# Patient Record
Sex: FEMALE | Race: OTHER | NOT HISPANIC OR LATINO | URBAN - METROPOLITAN AREA
[De-identification: names, ages, dates, MRNs, and addresses within clinical notes are randomized per-mention and may not be internally consistent; named-entity substitution may affect disease eponyms.]

---

## 2019-04-11 ENCOUNTER — OUTPATIENT (OUTPATIENT)
Dept: OUTPATIENT SERVICES | Age: 39
LOS: 1 days | Discharge: HOME | End: 2019-04-11

## 2019-04-12 ENCOUNTER — APPOINTMENT (OUTPATIENT)
Dept: PEDIATRIC CARDIOLOGY | Facility: CLINIC | Age: 39
End: 2019-04-12
Payer: MEDICAID

## 2019-04-12 PROCEDURE — 93325 DOPPLER ECHO COLOR FLOW MAPG: CPT | Mod: 59

## 2019-04-12 PROCEDURE — 76825 ECHO EXAM OF FETAL HEART: CPT

## 2019-04-12 PROCEDURE — 76827 ECHO EXAM OF FETAL HEART: CPT

## 2019-04-12 PROCEDURE — 99202 OFFICE O/P NEW SF 15 MIN: CPT | Mod: 25

## 2019-04-12 PROCEDURE — 76820 UMBILICAL ARTERY ECHO: CPT

## 2019-06-16 ENCOUNTER — OUTPATIENT (OUTPATIENT)
Dept: OUTPATIENT SERVICES | Facility: HOSPITAL | Age: 39
LOS: 1 days | End: 2019-06-16
Payer: MEDICAID

## 2019-06-16 DIAGNOSIS — O26.899 OTHER SPECIFIED PREGNANCY RELATED CONDITIONS, UNSPECIFIED TRIMESTER: ICD-10-CM

## 2019-06-16 DIAGNOSIS — Z3A.00 WEEKS OF GESTATION OF PREGNANCY NOT SPECIFIED: ICD-10-CM

## 2019-06-16 PROCEDURE — G0463: CPT

## 2019-06-16 PROCEDURE — 59025 FETAL NON-STRESS TEST: CPT

## 2019-06-16 PROCEDURE — 59025 FETAL NON-STRESS TEST: CPT | Mod: 26

## 2019-07-15 ENCOUNTER — APPOINTMENT (OUTPATIENT)
Dept: PEDIATRIC NEPHROLOGY | Facility: CLINIC | Age: 39
End: 2019-07-15
Payer: MEDICAID

## 2019-07-15 VITALS
HEIGHT: 65.35 IN | WEIGHT: 182.43 LBS | HEART RATE: 91 BPM | BODY MASS INDEX: 30.03 KG/M2 | SYSTOLIC BLOOD PRESSURE: 103 MMHG | DIASTOLIC BLOOD PRESSURE: 65 MMHG

## 2019-07-15 DIAGNOSIS — Z84.1 FAMILY HISTORY OF DISORDERS OF KIDNEY AND URETER: ICD-10-CM

## 2019-07-15 DIAGNOSIS — O35.8XX0 MATERNAL CARE FOR OTHER (SUSPECTED) FETAL ABNORMALITY AND DAMAGE, NOT APPLICABLE OR UNSPECIFIED: ICD-10-CM

## 2019-07-15 PROCEDURE — 99203 OFFICE O/P NEW LOW 30 MIN: CPT

## 2019-07-24 ENCOUNTER — OUTPATIENT (OUTPATIENT)
Dept: OUTPATIENT SERVICES | Facility: HOSPITAL | Age: 39
LOS: 1 days | End: 2019-07-24
Payer: MEDICAID

## 2019-07-24 DIAGNOSIS — Z3A.00 WEEKS OF GESTATION OF PREGNANCY NOT SPECIFIED: ICD-10-CM

## 2019-07-24 DIAGNOSIS — O26.899 OTHER SPECIFIED PREGNANCY RELATED CONDITIONS, UNSPECIFIED TRIMESTER: ICD-10-CM

## 2019-07-24 LAB — AMNISURE ROM (RUPTURE OF MEMBRANES): NEGATIVE — SIGNIFICANT CHANGE UP

## 2019-07-24 PROCEDURE — 76815 OB US LIMITED FETUS(S): CPT

## 2019-07-24 PROCEDURE — 59025 FETAL NON-STRESS TEST: CPT | Mod: 26

## 2019-07-24 PROCEDURE — G0463: CPT

## 2019-07-24 PROCEDURE — 84112 EVAL AMNIOTIC FLUID PROTEIN: CPT

## 2019-07-24 PROCEDURE — 76819 FETAL BIOPHYS PROFIL W/O NST: CPT

## 2019-07-24 PROCEDURE — 96360 HYDRATION IV INFUSION INIT: CPT | Mod: 59

## 2019-07-24 PROCEDURE — 76815 OB US LIMITED FETUS(S): CPT | Mod: 26

## 2019-07-24 PROCEDURE — 76819 FETAL BIOPHYS PROFIL W/O NST: CPT | Mod: 26

## 2019-07-24 PROCEDURE — 59025 FETAL NON-STRESS TEST: CPT

## 2019-07-24 RX ORDER — SODIUM CHLORIDE 9 MG/ML
1000 INJECTION, SOLUTION INTRAVENOUS ONCE
Refills: 0 | Status: DISCONTINUED | OUTPATIENT
Start: 2019-07-24 | End: 2019-08-09

## 2019-07-26 ENCOUNTER — OUTPATIENT (OUTPATIENT)
Dept: OUTPATIENT SERVICES | Facility: HOSPITAL | Age: 39
LOS: 1 days | End: 2019-07-26
Payer: MEDICAID

## 2019-07-26 DIAGNOSIS — Z3A.00 WEEKS OF GESTATION OF PREGNANCY NOT SPECIFIED: ICD-10-CM

## 2019-07-26 DIAGNOSIS — O26.899 OTHER SPECIFIED PREGNANCY RELATED CONDITIONS, UNSPECIFIED TRIMESTER: ICD-10-CM

## 2019-07-26 PROCEDURE — 59025 FETAL NON-STRESS TEST: CPT

## 2019-07-26 PROCEDURE — 76819 FETAL BIOPHYS PROFIL W/O NST: CPT

## 2019-07-26 PROCEDURE — 59025 FETAL NON-STRESS TEST: CPT | Mod: 26

## 2019-07-26 PROCEDURE — 96360 HYDRATION IV INFUSION INIT: CPT

## 2019-07-26 PROCEDURE — 76819 FETAL BIOPHYS PROFIL W/O NST: CPT | Mod: 26

## 2019-07-26 PROCEDURE — G0463: CPT

## 2019-07-26 RX ORDER — SODIUM CHLORIDE 9 MG/ML
1000 INJECTION, SOLUTION INTRAVENOUS ONCE
Refills: 0 | Status: COMPLETED | OUTPATIENT
Start: 2019-07-26 | End: 2019-07-26

## 2019-07-26 RX ADMIN — SODIUM CHLORIDE 1000 MILLILITER(S): 9 INJECTION, SOLUTION INTRAVENOUS at 16:20

## 2019-07-26 RX ADMIN — SODIUM CHLORIDE 1000 MILLILITER(S): 9 INJECTION, SOLUTION INTRAVENOUS at 14:59

## 2019-07-31 ENCOUNTER — TRANSCRIPTION ENCOUNTER (OUTPATIENT)
Age: 39
End: 2019-07-31

## 2019-07-31 ENCOUNTER — OUTPATIENT (OUTPATIENT)
Dept: OUTPATIENT SERVICES | Facility: HOSPITAL | Age: 39
LOS: 1 days | End: 2019-07-31
Payer: MEDICAID

## 2019-07-31 DIAGNOSIS — Z3A.39 39 WEEKS GESTATION OF PREGNANCY: ICD-10-CM

## 2019-07-31 DIAGNOSIS — Z01.818 ENCOUNTER FOR OTHER PREPROCEDURAL EXAMINATION: ICD-10-CM

## 2019-07-31 LAB
ANION GAP SERPL CALC-SCNC: 6 MMOL/L — SIGNIFICANT CHANGE UP (ref 5–17)
APTT BLD: 33.6 SEC — SIGNIFICANT CHANGE UP (ref 27.5–36.3)
BLD GP AB SCN SERPL QL: SIGNIFICANT CHANGE UP
BUN SERPL-MCNC: 10 MG/DL — SIGNIFICANT CHANGE UP (ref 7–18)
CALCIUM SERPL-MCNC: 9.3 MG/DL — SIGNIFICANT CHANGE UP (ref 8.4–10.5)
CHLORIDE SERPL-SCNC: 105 MMOL/L — SIGNIFICANT CHANGE UP (ref 96–108)
CO2 SERPL-SCNC: 25 MMOL/L — SIGNIFICANT CHANGE UP (ref 22–31)
CREAT SERPL-MCNC: 0.55 MG/DL — SIGNIFICANT CHANGE UP (ref 0.5–1.3)
GLUCOSE SERPL-MCNC: 128 MG/DL — HIGH (ref 70–99)
HCT VFR BLD CALC: 38 % — SIGNIFICANT CHANGE UP (ref 34.5–45)
HGB BLD-MCNC: 13 G/DL — SIGNIFICANT CHANGE UP (ref 11.5–15.5)
INR BLD: 0.9 RATIO — SIGNIFICANT CHANGE UP (ref 0.88–1.16)
MCHC RBC-ENTMCNC: 29.7 PG — SIGNIFICANT CHANGE UP (ref 27–34)
MCHC RBC-ENTMCNC: 34.2 GM/DL — SIGNIFICANT CHANGE UP (ref 32–36)
MCV RBC AUTO: 86.8 FL — SIGNIFICANT CHANGE UP (ref 80–100)
NRBC # BLD: 0 /100 WBCS — SIGNIFICANT CHANGE UP (ref 0–0)
PLATELET # BLD AUTO: 262 K/UL — SIGNIFICANT CHANGE UP (ref 150–400)
POTASSIUM SERPL-MCNC: 3.4 MMOL/L — LOW (ref 3.5–5.3)
POTASSIUM SERPL-SCNC: 3.4 MMOL/L — LOW (ref 3.5–5.3)
PROTHROM AB SERPL-ACNC: 10 SEC — SIGNIFICANT CHANGE UP (ref 10–12.9)
RBC # BLD: 4.38 M/UL — SIGNIFICANT CHANGE UP (ref 3.8–5.2)
RBC # FLD: 14.6 % — HIGH (ref 10.3–14.5)
SODIUM SERPL-SCNC: 136 MMOL/L — SIGNIFICANT CHANGE UP (ref 135–145)
WBC # BLD: 6.99 K/UL — SIGNIFICANT CHANGE UP (ref 3.8–10.5)
WBC # FLD AUTO: 6.99 K/UL — SIGNIFICANT CHANGE UP (ref 3.8–10.5)

## 2019-07-31 PROCEDURE — 86780 TREPONEMA PALLIDUM: CPT

## 2019-07-31 PROCEDURE — 86900 BLOOD TYPING SEROLOGIC ABO: CPT

## 2019-07-31 PROCEDURE — 36415 COLL VENOUS BLD VENIPUNCTURE: CPT

## 2019-07-31 PROCEDURE — 85730 THROMBOPLASTIN TIME PARTIAL: CPT

## 2019-07-31 PROCEDURE — 85027 COMPLETE CBC AUTOMATED: CPT

## 2019-07-31 PROCEDURE — 86901 BLOOD TYPING SEROLOGIC RH(D): CPT

## 2019-07-31 PROCEDURE — 80048 BASIC METABOLIC PNL TOTAL CA: CPT

## 2019-07-31 PROCEDURE — 86850 RBC ANTIBODY SCREEN: CPT

## 2019-07-31 PROCEDURE — 85610 PROTHROMBIN TIME: CPT

## 2019-07-31 PROCEDURE — G0463: CPT

## 2019-08-01 LAB — T PALLIDUM AB TITR SER: NEGATIVE — SIGNIFICANT CHANGE UP

## 2019-08-04 ENCOUNTER — TRANSCRIPTION ENCOUNTER (OUTPATIENT)
Age: 39
End: 2019-08-04

## 2019-08-05 ENCOUNTER — RESULT REVIEW (OUTPATIENT)
Age: 39
End: 2019-08-05

## 2019-08-05 ENCOUNTER — INPATIENT (INPATIENT)
Facility: HOSPITAL | Age: 39
LOS: 2 days | Discharge: HOME | End: 2019-08-08
Attending: OBSTETRICS & GYNECOLOGY | Admitting: OBSTETRICS & GYNECOLOGY
Payer: COMMERCIAL

## 2019-08-05 VITALS — HEIGHT: 64 IN | WEIGHT: 182.98 LBS

## 2019-08-05 DIAGNOSIS — Z3A.39 39 WEEKS GESTATION OF PREGNANCY: ICD-10-CM

## 2019-08-05 LAB
HIV 1 & 2 AB SERPL IA.RAPID: SIGNIFICANT CHANGE UP
HIV 1+2 AB+HIV1 P24 AG SERPL QL IA: SIGNIFICANT CHANGE UP

## 2019-08-05 PROCEDURE — 88307 TISSUE EXAM BY PATHOLOGIST: CPT | Mod: 26

## 2019-08-05 RX ORDER — SODIUM CHLORIDE 9 MG/ML
1000 INJECTION, SOLUTION INTRAVENOUS ONCE
Refills: 0 | Status: COMPLETED | OUTPATIENT
Start: 2019-08-05 | End: 2019-08-05

## 2019-08-05 RX ORDER — SODIUM CHLORIDE 9 MG/ML
1000 INJECTION, SOLUTION INTRAVENOUS
Refills: 0 | Status: DISCONTINUED | OUTPATIENT
Start: 2019-08-05 | End: 2019-08-08

## 2019-08-05 RX ORDER — LANOLIN
1 OINTMENT (GRAM) TOPICAL EVERY 6 HOURS
Refills: 0 | Status: DISCONTINUED | OUTPATIENT
Start: 2019-08-05 | End: 2019-08-08

## 2019-08-05 RX ORDER — IBUPROFEN 200 MG
600 TABLET ORAL EVERY 6 HOURS
Refills: 0 | Status: COMPLETED | OUTPATIENT
Start: 2019-08-05 | End: 2020-07-03

## 2019-08-05 RX ORDER — ACETAMINOPHEN 500 MG
975 TABLET ORAL
Refills: 0 | Status: DISCONTINUED | OUTPATIENT
Start: 2019-08-05 | End: 2019-08-08

## 2019-08-05 RX ORDER — DOCUSATE SODIUM 100 MG
100 CAPSULE ORAL
Refills: 0 | Status: DISCONTINUED | OUTPATIENT
Start: 2019-08-05 | End: 2019-08-06

## 2019-08-05 RX ORDER — HEPARIN SODIUM 5000 [USP'U]/ML
5000 INJECTION INTRAVENOUS; SUBCUTANEOUS EVERY 12 HOURS
Refills: 0 | Status: DISCONTINUED | OUTPATIENT
Start: 2019-08-05 | End: 2019-08-08

## 2019-08-05 RX ORDER — SODIUM CHLORIDE 9 MG/ML
1000 INJECTION, SOLUTION INTRAVENOUS
Refills: 0 | Status: DISCONTINUED | OUTPATIENT
Start: 2019-08-05 | End: 2019-08-05

## 2019-08-05 RX ORDER — FOLIC ACID 0.8 MG
1 TABLET ORAL DAILY
Refills: 0 | Status: DISCONTINUED | OUTPATIENT
Start: 2019-08-06 | End: 2019-08-08

## 2019-08-05 RX ORDER — METOCLOPRAMIDE HCL 10 MG
10 TABLET ORAL ONCE
Refills: 0 | Status: DISCONTINUED | OUTPATIENT
Start: 2019-08-05 | End: 2019-08-05

## 2019-08-05 RX ORDER — FERROUS SULFATE 325(65) MG
325 TABLET ORAL DAILY
Refills: 0 | Status: DISCONTINUED | OUTPATIENT
Start: 2019-08-06 | End: 2019-08-08

## 2019-08-05 RX ORDER — GLYCERIN ADULT
1 SUPPOSITORY, RECTAL RECTAL AT BEDTIME
Refills: 0 | Status: DISCONTINUED | OUTPATIENT
Start: 2019-08-05 | End: 2019-08-08

## 2019-08-05 RX ORDER — MAGNESIUM HYDROXIDE 400 MG/1
30 TABLET, CHEWABLE ORAL DAILY
Refills: 0 | Status: DISCONTINUED | OUTPATIENT
Start: 2019-08-05 | End: 2019-08-08

## 2019-08-05 RX ORDER — OXYTOCIN 10 UNIT/ML
333.33 VIAL (ML) INJECTION
Qty: 20 | Refills: 0 | Status: DISCONTINUED | OUTPATIENT
Start: 2019-08-05 | End: 2019-08-08

## 2019-08-05 RX ORDER — SIMETHICONE 80 MG/1
80 TABLET, CHEWABLE ORAL EVERY 4 HOURS
Refills: 0 | Status: DISCONTINUED | OUTPATIENT
Start: 2019-08-05 | End: 2019-08-08

## 2019-08-05 RX ORDER — DIPHENHYDRAMINE HCL 50 MG
25 CAPSULE ORAL EVERY 6 HOURS
Refills: 0 | Status: DISCONTINUED | OUTPATIENT
Start: 2019-08-05 | End: 2019-08-08

## 2019-08-05 RX ORDER — FAMOTIDINE 10 MG/ML
20 INJECTION INTRAVENOUS ONCE
Refills: 0 | Status: COMPLETED | OUTPATIENT
Start: 2019-08-05 | End: 2019-08-05

## 2019-08-05 RX ORDER — CITRIC ACID/SODIUM CITRATE 300-500 MG
30 SOLUTION, ORAL ORAL ONCE
Refills: 0 | Status: COMPLETED | OUTPATIENT
Start: 2019-08-05 | End: 2019-08-05

## 2019-08-05 RX ORDER — TETANUS TOXOID, REDUCED DIPHTHERIA TOXOID AND ACELLULAR PERTUSSIS VACCINE, ADSORBED 5; 2.5; 8; 8; 2.5 [IU]/.5ML; [IU]/.5ML; UG/.5ML; UG/.5ML; UG/.5ML
0.5 SUSPENSION INTRAMUSCULAR ONCE
Refills: 0 | Status: DISCONTINUED | OUTPATIENT
Start: 2019-08-05 | End: 2019-08-08

## 2019-08-05 RX ORDER — CEFAZOLIN SODIUM 1 G
2000 VIAL (EA) INJECTION ONCE
Refills: 0 | Status: COMPLETED | OUTPATIENT
Start: 2019-08-05 | End: 2019-08-05

## 2019-08-05 RX ORDER — KETOROLAC TROMETHAMINE 30 MG/ML
30 SYRINGE (ML) INJECTION EVERY 6 HOURS
Refills: 0 | Status: DISCONTINUED | OUTPATIENT
Start: 2019-08-05 | End: 2019-08-06

## 2019-08-05 RX ORDER — ASCORBIC ACID 60 MG
500 TABLET,CHEWABLE ORAL DAILY
Refills: 0 | Status: DISCONTINUED | OUTPATIENT
Start: 2019-08-05 | End: 2019-08-08

## 2019-08-05 RX ADMIN — Medication 100 MILLIGRAM(S): at 07:55

## 2019-08-05 RX ADMIN — Medication 30 MILLIGRAM(S): at 14:50

## 2019-08-05 RX ADMIN — Medication 30 MILLIGRAM(S): at 20:13

## 2019-08-05 RX ADMIN — Medication 30 MILLILITER(S): at 07:25

## 2019-08-05 RX ADMIN — Medication 975 MILLIGRAM(S): at 22:33

## 2019-08-05 RX ADMIN — FAMOTIDINE 20 MILLIGRAM(S): 10 INJECTION INTRAVENOUS at 07:25

## 2019-08-05 RX ADMIN — SODIUM CHLORIDE 125 MILLILITER(S): 9 INJECTION, SOLUTION INTRAVENOUS at 20:13

## 2019-08-05 RX ADMIN — Medication 30 MILLIGRAM(S): at 21:13

## 2019-08-05 RX ADMIN — SODIUM CHLORIDE 125 MILLILITER(S): 9 INJECTION, SOLUTION INTRAVENOUS at 07:22

## 2019-08-05 RX ADMIN — Medication 30 MILLIGRAM(S): at 14:20

## 2019-08-05 RX ADMIN — SODIUM CHLORIDE 2000 MILLILITER(S): 9 INJECTION, SOLUTION INTRAVENOUS at 06:30

## 2019-08-05 RX ADMIN — HEPARIN SODIUM 5000 UNIT(S): 5000 INJECTION INTRAVENOUS; SUBCUTANEOUS at 22:32

## 2019-08-05 RX ADMIN — Medication 1000 MILLIUNIT(S)/MIN: at 10:08

## 2019-08-05 RX ADMIN — Medication 975 MILLIGRAM(S): at 23:30

## 2019-08-05 NOTE — CHART NOTE - NSCHARTNOTEFT_GEN_A_CORE
pt doing well   offers no acute complaints    Vital Signs Last 24 Hrs  T(C): 36.7 (05 Aug 2019 13:17), Max: 36.7 (05 Aug 2019 13:17)  T(F): 98 (05 Aug 2019 13:17), Max: 98 (05 Aug 2019 13:17)  HR: 99 (05 Aug 2019 13:17) (68 - 103)  BP: 98/63 (05 Aug 2019 13:17) (88/54 - 121/93)  BP(mean): --  RR: 18 (05 Aug 2019 13:17) (15 - 20)  SpO2: 98% (05 Aug 2019 13:17) (95% - 100%)    dressing c/d  lochia min-mod  ext +venodyne boots  -weldon clear urine    a/p  pod#0 s/p rpt cs  -cont post op care

## 2019-08-06 LAB
ANION GAP SERPL CALC-SCNC: 5 MMOL/L — SIGNIFICANT CHANGE UP (ref 5–17)
BASOPHILS # BLD AUTO: 0.02 K/UL — SIGNIFICANT CHANGE UP (ref 0–0.2)
BASOPHILS NFR BLD AUTO: 0.2 % — SIGNIFICANT CHANGE UP (ref 0–2)
BUN SERPL-MCNC: 6 MG/DL — LOW (ref 7–18)
CALCIUM SERPL-MCNC: 8.4 MG/DL — SIGNIFICANT CHANGE UP (ref 8.4–10.5)
CHLORIDE SERPL-SCNC: 104 MMOL/L — SIGNIFICANT CHANGE UP (ref 96–108)
CO2 SERPL-SCNC: 30 MMOL/L — SIGNIFICANT CHANGE UP (ref 22–31)
CREAT SERPL-MCNC: 0.6 MG/DL — SIGNIFICANT CHANGE UP (ref 0.5–1.3)
EOSINOPHIL # BLD AUTO: 0.05 K/UL — SIGNIFICANT CHANGE UP (ref 0–0.5)
EOSINOPHIL NFR BLD AUTO: 0.4 % — SIGNIFICANT CHANGE UP (ref 0–6)
GLUCOSE SERPL-MCNC: 81 MG/DL — SIGNIFICANT CHANGE UP (ref 70–99)
HCT VFR BLD CALC: 30.2 % — LOW (ref 34.5–45)
HGB BLD-MCNC: 10.1 G/DL — LOW (ref 11.5–15.5)
IMM GRANULOCYTES NFR BLD AUTO: 0.5 % — SIGNIFICANT CHANGE UP (ref 0–1.5)
LYMPHOCYTES # BLD AUTO: 1.33 K/UL — SIGNIFICANT CHANGE UP (ref 1–3.3)
LYMPHOCYTES # BLD AUTO: 10.1 % — LOW (ref 13–44)
MCHC RBC-ENTMCNC: 29.5 PG — SIGNIFICANT CHANGE UP (ref 27–34)
MCHC RBC-ENTMCNC: 33.4 GM/DL — SIGNIFICANT CHANGE UP (ref 32–36)
MCV RBC AUTO: 88.3 FL — SIGNIFICANT CHANGE UP (ref 80–100)
MONOCYTES # BLD AUTO: 0.76 K/UL — SIGNIFICANT CHANGE UP (ref 0–0.9)
MONOCYTES NFR BLD AUTO: 5.8 % — SIGNIFICANT CHANGE UP (ref 2–14)
NEUTROPHILS # BLD AUTO: 10.99 K/UL — HIGH (ref 1.8–7.4)
NEUTROPHILS NFR BLD AUTO: 83 % — HIGH (ref 43–77)
NRBC # BLD: 0 /100 WBCS — SIGNIFICANT CHANGE UP (ref 0–0)
PLATELET # BLD AUTO: 222 K/UL — SIGNIFICANT CHANGE UP (ref 150–400)
POTASSIUM SERPL-MCNC: 3.8 MMOL/L — SIGNIFICANT CHANGE UP (ref 3.5–5.3)
POTASSIUM SERPL-SCNC: 3.8 MMOL/L — SIGNIFICANT CHANGE UP (ref 3.5–5.3)
RBC # BLD: 3.42 M/UL — LOW (ref 3.8–5.2)
RBC # FLD: 14.8 % — HIGH (ref 10.3–14.5)
SODIUM SERPL-SCNC: 139 MMOL/L — SIGNIFICANT CHANGE UP (ref 135–145)
WBC # BLD: 13.21 K/UL — HIGH (ref 3.8–10.5)
WBC # FLD AUTO: 13.21 K/UL — HIGH (ref 3.8–10.5)

## 2019-08-06 RX ORDER — DOCUSATE SODIUM 100 MG
100 CAPSULE ORAL
Refills: 0 | Status: DISCONTINUED | OUTPATIENT
Start: 2019-08-06 | End: 2019-08-08

## 2019-08-06 RX ORDER — IBUPROFEN 200 MG
600 TABLET ORAL EVERY 6 HOURS
Refills: 0 | Status: DISCONTINUED | OUTPATIENT
Start: 2019-08-06 | End: 2019-08-08

## 2019-08-06 RX ADMIN — Medication 30 MILLIGRAM(S): at 10:15

## 2019-08-06 RX ADMIN — Medication 30 MILLIGRAM(S): at 09:57

## 2019-08-06 RX ADMIN — Medication 975 MILLIGRAM(S): at 14:12

## 2019-08-06 RX ADMIN — Medication 600 MILLIGRAM(S): at 22:00

## 2019-08-06 RX ADMIN — Medication 600 MILLIGRAM(S): at 21:30

## 2019-08-06 RX ADMIN — Medication 30 MILLIGRAM(S): at 02:38

## 2019-08-06 RX ADMIN — Medication 30 MILLIGRAM(S): at 03:38

## 2019-08-06 RX ADMIN — HEPARIN SODIUM 5000 UNIT(S): 5000 INJECTION INTRAVENOUS; SUBCUTANEOUS at 10:08

## 2019-08-06 RX ADMIN — Medication 1 MILLIGRAM(S): at 11:24

## 2019-08-06 RX ADMIN — Medication 1 TABLET(S): at 11:23

## 2019-08-06 RX ADMIN — Medication 975 MILLIGRAM(S): at 21:30

## 2019-08-06 RX ADMIN — Medication 325 MILLIGRAM(S): at 11:23

## 2019-08-06 RX ADMIN — HEPARIN SODIUM 5000 UNIT(S): 5000 INJECTION INTRAVENOUS; SUBCUTANEOUS at 22:00

## 2019-08-06 RX ADMIN — Medication 975 MILLIGRAM(S): at 21:00

## 2019-08-06 RX ADMIN — SIMETHICONE 80 MILLIGRAM(S): 80 TABLET, CHEWABLE ORAL at 11:24

## 2019-08-06 RX ADMIN — Medication 500 MILLIGRAM(S): at 11:23

## 2019-08-06 RX ADMIN — Medication 975 MILLIGRAM(S): at 14:45

## 2019-08-06 NOTE — PROGRESS NOTE ADULT - SUBJECTIVE AND OBJECTIVE BOX
Patient seen at [facility] resting comfortably offers no new complaints. + Ambulation to chair, weldon removed, due to void, + flatus; tolerating regular diet.  bottle feeding. Denies HA, CP, SOB, N/V/D,  no bm; dizziness, palpitations, worsening abdominal pain, worsening vaginal bleeding, or any other concerns.   Vital Signs Last 24 Hrs  T(C): 36.9 (06 Aug 2019 06:04), Max: 37.6 (05 Aug 2019 20:28)  T(F): 98.4 (06 Aug 2019 06:04), Max: 99.6 (05 Aug 2019 20:28)  HR: 92 (06 Aug 2019 06:04) (68 - 103)  BP: 92/59 (06 Aug 2019 06:04) (88/54 - 121/93)  BP(mean): --  RR: 16 (06 Aug 2019 06:04) (15 - 20)  SpO2: 98% (06 Aug 2019 06:04) (95% - 100%)    Gen: A&O x 3, NAD  Chest: CTABL  Cardiac: S1+S2+ RRR  Breast: Soft, nontender, nonengorged  Abdomen: +BS; soft; Nontender, nondistended, dressing C/D/I   Gyn: Minimal lochia  Extremities: Nontender, DTRS 2+, no worsening edema                        10.1   13.21 )-----------( 222      ( 06 Aug 2019 06:52 )             30.2       A/P: 39 year old POD #1 s/p c/s     -Pain management as needed  -cont post op care  -adv diet to regular   -OOB and ambulate  -f/u Rpt CBC in am   -encourage incentive spirometer use  -Encourage breastfeeding   -d/w dr. Joseph

## 2019-08-06 NOTE — PROGRESS NOTE ADULT - PROBLEM SELECTOR PLAN 1
-Pain management as needed  -cont post op care  -adv diet to regular   -OOB and ambulate  -f/u Rpt CBC in am   -encourage incentive spirometer use  -Encourage breastfeeding   -d/w dr. Joseph

## 2019-08-07 ENCOUNTER — TRANSCRIPTION ENCOUNTER (OUTPATIENT)
Age: 39
End: 2019-08-07

## 2019-08-07 LAB
BASOPHILS # BLD AUTO: 0.02 K/UL — SIGNIFICANT CHANGE UP (ref 0–0.2)
BASOPHILS NFR BLD AUTO: 0.1 % — SIGNIFICANT CHANGE UP (ref 0–2)
EOSINOPHIL # BLD AUTO: 0.13 K/UL — SIGNIFICANT CHANGE UP (ref 0–0.5)
EOSINOPHIL NFR BLD AUTO: 1 % — SIGNIFICANT CHANGE UP (ref 0–6)
HCT VFR BLD CALC: 32.9 % — LOW (ref 34.5–45)
HGB BLD-MCNC: 11.1 G/DL — LOW (ref 11.5–15.5)
IMM GRANULOCYTES NFR BLD AUTO: 0.8 % — SIGNIFICANT CHANGE UP (ref 0–1.5)
LYMPHOCYTES # BLD AUTO: 1.96 K/UL — SIGNIFICANT CHANGE UP (ref 1–3.3)
LYMPHOCYTES # BLD AUTO: 14.5 % — SIGNIFICANT CHANGE UP (ref 13–44)
MCHC RBC-ENTMCNC: 29.8 PG — SIGNIFICANT CHANGE UP (ref 27–34)
MCHC RBC-ENTMCNC: 33.7 GM/DL — SIGNIFICANT CHANGE UP (ref 32–36)
MCV RBC AUTO: 88.2 FL — SIGNIFICANT CHANGE UP (ref 80–100)
MONOCYTES # BLD AUTO: 0.85 K/UL — SIGNIFICANT CHANGE UP (ref 0–0.9)
MONOCYTES NFR BLD AUTO: 6.3 % — SIGNIFICANT CHANGE UP (ref 2–14)
NEUTROPHILS # BLD AUTO: 10.47 K/UL — HIGH (ref 1.8–7.4)
NEUTROPHILS NFR BLD AUTO: 77.3 % — HIGH (ref 43–77)
NRBC # BLD: 0 /100 WBCS — SIGNIFICANT CHANGE UP (ref 0–0)
PLATELET # BLD AUTO: 275 K/UL — SIGNIFICANT CHANGE UP (ref 150–400)
RBC # BLD: 3.73 M/UL — LOW (ref 3.8–5.2)
RBC # FLD: 15 % — HIGH (ref 10.3–14.5)
WBC # BLD: 13.54 K/UL — HIGH (ref 3.8–10.5)
WBC # FLD AUTO: 13.54 K/UL — HIGH (ref 3.8–10.5)

## 2019-08-07 RX ORDER — IBUPROFEN 200 MG
1 TABLET ORAL
Qty: 40 | Refills: 0
Start: 2019-08-07 | End: 2019-08-16

## 2019-08-07 RX ORDER — DOCUSATE SODIUM 100 MG
1 CAPSULE ORAL
Qty: 60 | Refills: 0
Start: 2019-08-07 | End: 2019-09-05

## 2019-08-07 RX ADMIN — Medication 100 MILLIGRAM(S): at 05:28

## 2019-08-07 RX ADMIN — Medication 325 MILLIGRAM(S): at 11:44

## 2019-08-07 RX ADMIN — HEPARIN SODIUM 5000 UNIT(S): 5000 INJECTION INTRAVENOUS; SUBCUTANEOUS at 21:30

## 2019-08-07 RX ADMIN — Medication 975 MILLIGRAM(S): at 03:20

## 2019-08-07 RX ADMIN — Medication 600 MILLIGRAM(S): at 21:29

## 2019-08-07 RX ADMIN — Medication 600 MILLIGRAM(S): at 16:30

## 2019-08-07 RX ADMIN — Medication 600 MILLIGRAM(S): at 11:20

## 2019-08-07 RX ADMIN — Medication 600 MILLIGRAM(S): at 16:00

## 2019-08-07 RX ADMIN — Medication 1 MILLIGRAM(S): at 11:45

## 2019-08-07 RX ADMIN — Medication 600 MILLIGRAM(S): at 22:00

## 2019-08-07 RX ADMIN — MAGNESIUM HYDROXIDE 30 MILLILITER(S): 400 TABLET, CHEWABLE ORAL at 11:43

## 2019-08-07 RX ADMIN — Medication 600 MILLIGRAM(S): at 03:50

## 2019-08-07 RX ADMIN — Medication 975 MILLIGRAM(S): at 15:17

## 2019-08-07 RX ADMIN — Medication 500 MILLIGRAM(S): at 11:44

## 2019-08-07 RX ADMIN — Medication 975 MILLIGRAM(S): at 03:50

## 2019-08-07 RX ADMIN — Medication 600 MILLIGRAM(S): at 03:22

## 2019-08-07 RX ADMIN — Medication 975 MILLIGRAM(S): at 22:00

## 2019-08-07 RX ADMIN — Medication 100 MILLIGRAM(S): at 17:52

## 2019-08-07 RX ADMIN — Medication 975 MILLIGRAM(S): at 16:00

## 2019-08-07 RX ADMIN — Medication 1 TABLET(S): at 11:42

## 2019-08-07 RX ADMIN — Medication 975 MILLIGRAM(S): at 21:29

## 2019-08-07 RX ADMIN — HEPARIN SODIUM 5000 UNIT(S): 5000 INJECTION INTRAVENOUS; SUBCUTANEOUS at 10:52

## 2019-08-07 RX ADMIN — Medication 975 MILLIGRAM(S): at 08:58

## 2019-08-07 RX ADMIN — Medication 975 MILLIGRAM(S): at 09:30

## 2019-08-07 RX ADMIN — Medication 600 MILLIGRAM(S): at 10:50

## 2019-08-07 NOTE — DISCHARGE NOTE OB - HOSPITAL COURSE
patient admitted for scheduled repeat c/s  operative and postoperative course uncomplicated  patient discharged home POD#3 without complication

## 2019-08-07 NOTE — DISCHARGE NOTE OB - MEDICATION SUMMARY - MEDICATIONS TO TAKE
I will START or STAY ON the medications listed below when I get home from the hospital:    ibuprofen 600 mg oral tablet  -- 1 tab(s) by mouth every 6 hours, As Needed   -- Indication: For pain    Colace 100 mg oral capsule  -- 1 cap(s) by mouth 2 times a day, As Needed   -- Medication should be taken with plenty of water.    -- Indication: For stool softener

## 2019-08-07 NOTE — PROGRESS NOTE ADULT - PROBLEM SELECTOR PLAN 1
-Pain management as needed  -cont post op care  -OOB and ambulate  -encourage insentive spirometer use  -Encourage breastfeeding  -Plan to discharge home tomorrow    -d/w Dr. Nye, [place] attending

## 2019-08-07 NOTE — DISCHARGE NOTE OB - CARE PROVIDER_API CALL
Veronica Costa)  Obstetrics and Gynecology  [address], [address]  [place] [address]  Phone: [phone]  Fax: [phone]  Follow Up Time:

## 2019-08-07 NOTE — DISCHARGE NOTE OB - PATIENT PORTAL LINK FT
You can access the [facility] Patient Portal, offered by [facility], by registering with the following website: [email or web address]

## 2019-08-07 NOTE — DISCHARGE NOTE OB - CARE PLAN
Principal Discharge DX:	 delivery delivered  Goal:	postoperative care and pain management  Assessment and plan of treatment:	Continue breastfeeding.  Motrin as needed for pain.  Ambulate daily.  No heavy lifting or anything per vagina x 6 weeks - no sex, tampons, douching, tub baths, etc.  Follow up in office in 2 weeks for incision wound check, and then at 6 weeks for postpartum check.

## 2019-08-07 NOTE — PROGRESS NOTE ADULT - SUBJECTIVE AND OBJECTIVE BOX
Patient seen at bedisde resting comfortably offers no new complaints. + Ambulation, + void without difficulty, + flatus; tolerating regular diet. both breastfeeding and bottle feeding. Denies CP, SOB, N/V/D,  no bm; dizziness, palpitations, worsening abdominal pain, worsening vaginal bleeding, or any other concerns.     Vital Signs Last 24 Hrs  T(C): 36.6 (07 Aug 2019 06:13), Max: 36.9 (06 Aug 2019 13:36)  T(F): 97.9 (07 Aug 2019 06:13), Max: 98.5 (06 Aug 2019 13:36)  HR: 82 (07 Aug 2019 06:13) (81 - 110)  BP: 101/70 (07 Aug 2019 06:13) (95/66 - 101/70)  BP(mean): --  RR: 16 (07 Aug 2019 06:13) (16 - 18)  SpO2: 98% (07 Aug 2019 06:13) (98% - 99%)    Gen: A&O x 3, NAD  Chest: CTABL  Cardiac: S1, S2, RRR  Abdomen: +BS; soft; Nontender, nondistended, Incision C/D/I steri strips in place   Gyn: Minimal lochia  Extremities: Nontender, no worsening edema                          11.1   13.54 )-----------( 275      ( 07 Aug 2019 07:12 )             32.9

## 2019-08-07 NOTE — DISCHARGE NOTE OB - PLAN OF CARE
postoperative care and pain management Continue breastfeeding.  Motrin as needed for pain.  Ambulate daily.  No heavy lifting or anything per vagina x 6 weeks - no sex, tampons, douching, tub baths, etc.  Follow up in office in 2 weeks for incision wound check, and then at 6 weeks for postpartum check.

## 2019-08-08 ENCOUNTER — EMERGENCY (EMERGENCY)
Facility: HOSPITAL | Age: 39
LOS: 1 days | Discharge: HOME | End: 2019-08-08
Attending: EMERGENCY MEDICINE
Payer: MEDICAID

## 2019-08-08 VITALS
SYSTOLIC BLOOD PRESSURE: 107 MMHG | TEMPERATURE: 98 F | HEIGHT: 66 IN | WEIGHT: 179.9 LBS | OXYGEN SATURATION: 100 % | RESPIRATION RATE: 20 BRPM | HEART RATE: 92 BPM | DIASTOLIC BLOOD PRESSURE: 74 MMHG

## 2019-08-08 VITALS
DIASTOLIC BLOOD PRESSURE: 62 MMHG | SYSTOLIC BLOOD PRESSURE: 99 MMHG | HEART RATE: 84 BPM | OXYGEN SATURATION: 98 % | RESPIRATION RATE: 17 BRPM | TEMPERATURE: 98 F

## 2019-08-08 PROCEDURE — C1765: CPT

## 2019-08-08 PROCEDURE — 99282 EMERGENCY DEPT VISIT SF MDM: CPT

## 2019-08-08 PROCEDURE — 87389 HIV-1 AG W/HIV-1&-2 AB AG IA: CPT

## 2019-08-08 PROCEDURE — 80048 BASIC METABOLIC PNL TOTAL CA: CPT

## 2019-08-08 PROCEDURE — 36415 COLL VENOUS BLD VENIPUNCTURE: CPT

## 2019-08-08 PROCEDURE — 88307 TISSUE EXAM BY PATHOLOGIST: CPT

## 2019-08-08 PROCEDURE — 86850 RBC ANTIBODY SCREEN: CPT

## 2019-08-08 PROCEDURE — 85027 COMPLETE CBC AUTOMATED: CPT

## 2019-08-08 PROCEDURE — 59050 FETAL MONITOR W/REPORT: CPT

## 2019-08-08 PROCEDURE — 86703 HIV-1/HIV-2 1 RESULT ANTBDY: CPT

## 2019-08-08 PROCEDURE — 86923 COMPATIBILITY TEST ELECTRIC: CPT

## 2019-08-08 PROCEDURE — 86901 BLOOD TYPING SEROLOGIC RH(D): CPT

## 2019-08-08 PROCEDURE — 86900 BLOOD TYPING SEROLOGIC ABO: CPT

## 2019-08-08 RX ADMIN — Medication 975 MILLIGRAM(S): at 03:32

## 2019-08-08 RX ADMIN — Medication 975 MILLIGRAM(S): at 04:00

## 2019-08-08 RX ADMIN — Medication 600 MILLIGRAM(S): at 04:16

## 2019-08-08 RX ADMIN — Medication 100 MILLIGRAM(S): at 06:14

## 2019-08-08 RX ADMIN — Medication 600 MILLIGRAM(S): at 04:00

## 2019-08-08 NOTE — ED ADULT NURSE NOTE - OBJECTIVE STATEMENT
Post-Care Instructions: I reviewed with the patient in detail post-care instructions. Patient is to wear sunprotection, and avoid picking at any of the treated lesions. Pt may apply Vaseline to crusted or scabbing areas. Consent: The patient's consent was obtained including but not limited to risks of crusting, scabbing, blistering, scarring, darker or lighter pigmentary change, recurrence, incomplete removal and infection. Number Of Freeze-Thaw Cycles: 2 freeze-thaw cycles Render Post-Care Instructions In Note?: no Detail Level: Zone Duration Of Freeze Thaw-Cycle (Seconds): 3 Pt came in reporting bilateral leg edema following  and was discharged from the hospital today. AxO3. Ot denied any SOB, chest pain, difficulty breathing, N/V/D. All safety precautions in place. Pt came in reporting bilateral leg edema following  and was discharged from the hospital today. AxO3. Ot denied any SOB, chest pain, difficulty breathing, N/V/D. All safety precautions in place. Pt seen by Dr Marie GYN in ED.

## 2019-08-08 NOTE — ED PROVIDER NOTE - OBJECTIVE STATEMENT
Pertinent PMH/PSH/FHx/SHx and Review of Systems contained within:  39F hx  this morning pw bl extremity weeping edema. patient was dced this morning from the hospital and told she would have edema. she went home and noted that there was discharge from her toes. she called her ob and was told to present to the er. she has no cp, sob, nausea, vomiting, fever, weakness, numbness. she has ongoing vag bleeding. ros neg for ha, vision loss, rhinorrhea, dysuria, rash, or other bleeding  Fh and Sh not otherwise contributory  ROS otherwise negative

## 2019-08-08 NOTE — ED ADULT TRIAGE NOTE - CHIEF COMPLAINT QUOTE
Pt stated she was discharge from hospital this morning after having a c/section,. Pt stated when she was discharge both legs and feet were swollen and she noticed brownish liquid was coming from both feet. spoke with her gyn doctor who told her that was not normal and to come back to the hospital.

## 2019-08-08 NOTE — ED PROVIDER NOTE - PHYSICAL EXAMINATION
Gen: Alert, NAD  Head: NC, AT   Eyes: PERRL, EOMI, normal lids/conjunctiva  ENT: normal hearing, patent oropharynx without erythema/exudate, uvula midline  Neck: supple, no tenderness, Trachea midline  Pulm: Bilateral BS, normal resp effort, no wheeze/stridor/retractions  CV: RRR, no M/R/G, 2+ radial and dp pulses bl, 2+ edema bl le. no weeping noted  Abd: soft, NT/ND, +BS, no hepatosplenomegaly  Mskel: extremities x4 with normal ROM and no joint effusions. no ctl spine ttp.   Skin: no rash, no bruising   Neuro: AAOx3, no sensory/motor deficits, CN 2-12 intact

## 2019-08-08 NOTE — PROGRESS NOTE ADULT - SUBJECTIVE AND OBJECTIVE BOX
Patient seen resting comfortably.  No new complaints.  Denies HA, CP, SOB, N/V/D, dizziness, palpitations, worsening abdominal pain, worsening vaginal bleeding, or any other concerns. + Ambulation, + void without difficulty, + flatus and tolerating regular diet. Attempting breastfeeding.     Vital Signs Last 24 Hrs  T(C): 36.6 (08 Aug 2019 05:02), Max: 36.7 (08 Aug 2019 00:02)  T(F): 97.9 (08 Aug 2019 05:02), Max: 98 (08 Aug 2019 00:02)  HR: 84 (08 Aug 2019 05:02) (78 - 84)  BP: 99/62 (08 Aug 2019 05:02) (92/62 - 101/69)  RR: 17 (08 Aug 2019 05:02) (16 - 18)  SpO2: 98% (08 Aug 2019 05:02) (97% - 99%)    Gen: A&O x 3, NAD  Chest: CTABL  Cardiac: S1+S2+ RRR  Breast: Soft, nontender, nonengorged  Abdomen: Nontender, nondistended, +BS, Incision clean dry and intact with steris in place   Gyn: Minimal bleeding  Extremities: Nontender, DTRS 2+, no worsening edema                          11.1   13.54 )-----------( 275      ( 07 Aug 2019 07:12 )             32.9         A/P:  Patient is a 39 year old P2 s/p  section, POD #3.  Stable for discharge .    -Discharge home with instructions given  -Ambulate daily  -Pain management as needed  -Encourage breastfeeding  -Follow up in office in 2 weeks for incision check    Attending aware

## 2019-08-08 NOTE — ED PROVIDER NOTE - CLINICAL SUMMARY MEDICAL DECISION MAKING FREE TEXT BOX
lower extremity edema. lower extremity edema. seen by the gyn team including the doctor who discharged her dr chin saw legs and think they are appropriate for post c section dc. no further work up needed as she does not show signs of pre-eclampsia or dvt.

## 2019-08-09 LAB — SURGICAL PATHOLOGY STUDY: SIGNIFICANT CHANGE UP

## 2019-08-23 PROBLEM — Z84.1 FAMILY HISTORY OF HYDRONEPHROSIS: Status: ACTIVE | Noted: 2019-08-23

## 2019-08-23 NOTE — REASON FOR VISIT
[Initial Evaluation] : an initial evaluation of [Mother] : mother [FreeTextEntry3] : prenatal evaluation

## 2019-08-23 NOTE — CONSULT LETTER
[Dear  ___] : Dear ~DOLLY, [Consult Letter:] : I had the pleasure of evaluating your patient, [unfilled]. [Consult Closing:] : Thank you very much for allowing me to participate in the care of this patient.  If you have any questions, please do not hesitate to contact me. [Please see my note below.] : Please see my note below. [Sincerely,] : Sincerely, [FreeTextEntry3] : Dr. Osborne\par

## 2020-01-22 ENCOUNTER — RESULT REVIEW (OUTPATIENT)
Age: 40
End: 2020-01-22

## 2022-03-28 ENCOUNTER — APPOINTMENT (OUTPATIENT)
Dept: PHYSICAL MEDICINE AND REHAB | Facility: CLINIC | Age: 42
End: 2022-03-28
Payer: MEDICAID

## 2022-03-28 ENCOUNTER — APPOINTMENT (OUTPATIENT)
Dept: ORTHOPEDIC SURGERY | Facility: CLINIC | Age: 42
End: 2022-03-28

## 2022-03-28 ENCOUNTER — NON-APPOINTMENT (OUTPATIENT)
Age: 42
End: 2022-03-28

## 2022-03-28 VITALS — HEART RATE: 87 BPM | OXYGEN SATURATION: 99 % | SYSTOLIC BLOOD PRESSURE: 121 MMHG | DIASTOLIC BLOOD PRESSURE: 86 MMHG

## 2022-03-28 PROCEDURE — 99205 OFFICE O/P NEW HI 60 MIN: CPT

## 2022-03-28 RX ORDER — METHYLPREDNISOLONE 4 MG/1
4 TABLET ORAL
Qty: 2 | Refills: 0 | Status: ACTIVE | COMMUNITY
Start: 2022-03-28 | End: 1900-01-01

## 2022-03-28 RX ORDER — METHOCARBAMOL 750 MG/1
750 TABLET, FILM COATED ORAL 3 TIMES DAILY
Qty: 84 | Refills: 0 | Status: ACTIVE | COMMUNITY
Start: 2022-03-28 | End: 1900-01-01

## 2022-03-28 NOTE — ASSESSMENT
[FreeTextEntry1] :                                                       Assessment/Plan:\par \par ADDIS BORJA is a 42 year female with acute onset of radicular low back pain presents for initial consultation. The acute onset pain is unlike any previous pain she has experienced in both distribution and intensity, has not ameliorated with her usual at-home regimen, and continues to cause her marked functional decline.\par \par 1. Radiculitis, Lumbosacral, L5, S1\par 2. HNP, lumbosacral region\par 3. Lumbosacral spondylosis without myelopathy\par 4. Spasm of lumbar paraspinal muscle\par 5. Lumbar strain, initial encounter\par 6. Sacral pain\par \par - Tiers of treatment and management of above diagnosis(es) were discussed with patient\par - Optimal diet, weight, sleep, and lifestyle management to minimize stress and maximize well being counseling provided\par - Patient was advised to hold off on a structured, targeted therapy program until pain remits at least partially\par - Patient was educated on an appropriate home exercise program, provided with exercise recommendations, all questions answered\par - Patient was advised to start medrol dose pack\par - Patient was advised to trial methocarbamol 750mg 1-2 tabs up to TID, reviewed sedation and need to avoid driving or operating heavy machinery, patient displayed a clear understanding of plan of care, all questions answered\par - Patient was advised to apply cool compresses or warm heat to affected regions PRN\par - Radiographs of lumbar spine ordered today \par \par - Patient was prescribed medrol dose pack (x2) with written instructions, all questions answered, informed of side effects of the medication.  Possible side effects, including hyperglycemia, GI upset, and GI bleed, reviewed with patient. In agreement with patient that potential pain reduction and anti-inflammatory benefits currently outweigh known side effect profile for oral corticosteroids. Patient instructed to immediately stop medication should she develop any abdominal pain, nausea, vomiting, bloody stools, or BRBPR\par \par - Educated about red flag symptoms including (but not limited to) new, worsened, or persistent: fever greater than 100F, bowel or bladder incontinence, bowel obstipation, inability to void urine, urinary leakage, Severe nausea or vomiting, Worsening numbness, worsening tingling/paresthesias, and/or new or progressive motor weakness; advised to seek immediate medical attention at his nearest Emergency department should they experience any of the above\par \par - Patient relates having interest in locally directed treatment of the spine at this time, they were counseled on the role for local treatment as part of the tiers of treatment for their condition, all questions answered\par \par - MRI lumbar spine without contrast is indicated given that the pt has not improved with tylenol, ibuprofen, naproxen, and they underwent non-diagnostic radiographic imaging of the lumbar spine. Patient's imaging is medically necessary to outline targets for locally (interventional) directed treatments and/or guide surgical management. \par \par - Follow up in 3 weeks after imaging and course of oral corticosteroids\par \par I have personally spent a total of at least 60 minutes preparing, reviewing internal and external records, explaining, counseling, and coordinating care for this patient encounter.\par \par Thank you, Dr Dee Hall, for allowing me to participate in the care of your patient. Please do not hesitate to contact me with questions/concerns.\par \par Jaden Navarrete M.D.\par Sports and Interventional Spine\par Department of Physical Medicine and Rehabilitation \par [facility] \par Email: [email or web address]\par \par [facility] [place] \par [facility]\par [address]\[place] [address]\par \par Appointments: [phone]\par Fax: [phone]

## 2022-03-28 NOTE — HISTORY OF PRESENT ILLNESS
[FreeTextEntry1] : Jaden Navarrete M.D.\par Sports Medicine and Interventional Spine\par Department of Physical Medicine and Rehabilitation \par Health system \par Email: [email or web address] <[email or web address]>\par \par [facility]\par [facility] [place] [facility]\par [address] [place], [address] Floor\[place] [address]\par \par [facility]\par [facility] [place] [facility]\par [address], 4[address]\[place] [address]\par \par [facility] \par [facility]\par [address], [address]\[place] [address]\par \par [facility] at [facility]\par [address]\[place] [address]\par \par [facility] [place] \par [facility]\par [address]\[place] [address]\par \par For [place] Appointments\par Phone: [phone]\par Fax: [phone]\par \par For [place] Appointments\par Phone: [phone]\par Fax: [phone]\par \par \par ----------------------------------------------------------------------------------------------------------------------------------------\par \par PATIENT: ADDIS BORJA \par MRN: [id number] \par YOB: 1980 \par DATE OF VISIT: 03/28/2022 \par Referred by Doesnt have PCP\par PCP ADDRESS:\par \par Mar 28, 2022 \par \par \par Dear Dr. RITTER,REFERRED \par \par Thank you for referring ADDIS BORJA to my Sports and Interventional Spine practice and office. Enclosed is a copy of the patient's consultation/progress note, which includes my complete assessment and recent studies completed during the patient's evaluation.\par \par If you have questions or have any patients who require nonsurgical, non-opiate management of any sports, spine, or musculoskeletal conditions, please do not hesitate to contact my , Celina Aiken at [phone].\par \par I look forward to taking care of your patients along with you.\par \par Sincerely,\par \par Jaden\par \par Jaden Navarrete MD\par Sports, Interventional Spine, & Regenerative Musculoskeletal Medicine\par [facility] [place] [facility]\par Email: [email or web address]\par \par \par                                                   Initial Consultation:\par CC: lumbar pain\par \par HPI:  This is the first visit to [facility] [place] [facility] Interventional Spine Practice.  \par \par ADDIS BORJA presents with the chief complaint as above.  \par \par Hx:\par reports two weeks of intermittently severe left lower limb radicular pain and left lumbar pain\par cannot cite inciting event, carrying groceries and caring for her child\par she has had previous flare ups that resolved with conservative treatment\par previous flare ups included pain radiating down the left limb\par \par The patient’s difficulties began two weeks ago, on and off for several years  \par The pain is graded as 8-9/10 particularly while standing\par The pain is described as sharp, lancinating pain in the left lower limb\par The pain is intermittent\par The pain radiates in the left LOWER limbs in a L5, S1 distribution\par The patient feels that the pain is overall persistent. \par Patient denies other recent fall, MVA, injury, trauma, or accident besides presenting history above\par \par Patient reports increasing frequency of severe pain as well as increasingly severe intensity of pain\par Patient reports having difficulty with ambulation indoors due to pain and stiffness from the left lumbar region\par Patient reports difficulty with everyday activities including reaching down/bending forward for lower body dressing, toileting, care for her child\par \par Aggravating: ambulation, prolonged standing, navigating stairs, sit to stand transitional movements, flexing the neck forward while seated, turning in bed, getting out of bed\par Alleviating: rest, avoiding prolonged standing, pharmacologic treatments (baclofen, naproxen)\par \par Meds: denies regular PO pain medications\par Therapy Program: no recent structured targeted therapy program\par HEP: not doing HEP regularly\par \par Assoc Sx:\par Denies Numbness\par Denies Tingling\par Denies Focal motor weakness in the upper or lower limbs\par Denies New or worsened bowel or bladder incontinence\par Denies Saddle anesthesia\par Denies Buckling\par Denies Using Orthotic(s)\par Denies Swelling in the upper/lower extremities\par They also deny frequent tripping, falling\par \par ROS: A 14 point review of systems was completed. Positive findings are pain as described above. The remaining systems negative.\par \par Breast Cancer Surveillance: up to date\par COVID HX: reviewed\par \par Assoc Hx:\par Ambulates without assistive device\par Injection Hx: denies locally directed treatment to the area in question\par Imaging Hx: reviewed\par \par Level of functioning: indep with ambulation, indep with ADLs\par Living Situation: dwelling with steps to enter\par \par

## 2022-03-28 NOTE — PHYSICAL EXAM
[FreeTextEntry1] : Gen: A+O x 3 in NAD\par Psych: Normal mood and affect. Responds appropriately to commands\par Eyes: Anicteric. No discharge. EOMI.\par Resp: Breathing unlabored\par CV: DP pulses 2+ and equal. No varicosities noted\par Ext: No c/c/e\par Skin: No lesions noted\par \par Gait; +antalgic, listing to the right side, able to briefly stand on toes and heels with pain but able to perform calf raises consistently but <10 due to left leg spasm\par \par Inspection: Spine alignment is midline.\par Palpation: There is + tenderness over the midline spinous processes, paravertebral muscles lumbar region\par + TTP over the sacrum as well\par Lumbar ROM: Flexion, extension, side-bending, rotation, limited in all planes with worst pain during oblique extension and lateral flexion\par Hip ROM: Full and painless bilaterally.\par FAIR, FABERE negative bilaterally.\par \par 	Hip Flex       Knee Ext      Ankle Dorsi           EHL        Ankle Plantar\par Right	5-/5	        5/5	                 5/5	          5/5	             5/5                           \par Left	5-/5	        5/5	                 5/5	          5/5	             5/5                           \par \par Hip abduction 4+/5\par Hip adduction 4+/5\par Knee Flexion 4+/5\par \par Tone: Normal. No clonus.\par Sensation: Grossly intact to light touch bilateral lower limbs.\par Proprioception: Intact at big toes bilaterally.\par Reflexes: 3+ symmetric knee jerk, 2+ ankle jerk, BUE 3+. Plantars downgoing bilaterally.\par SLR positive on the left\par Crossed SLR negative bilaterally.\par Slump test + left

## 2022-04-07 ENCOUNTER — TRANSCRIPTION ENCOUNTER (OUTPATIENT)
Age: 42
End: 2022-04-07

## 2022-04-12 ENCOUNTER — APPOINTMENT (OUTPATIENT)
Dept: PHYSICAL MEDICINE AND REHAB | Facility: CLINIC | Age: 42
End: 2022-04-12
Payer: MEDICAID

## 2022-04-12 PROCEDURE — 99215 OFFICE O/P EST HI 40 MIN: CPT

## 2022-04-12 NOTE — PHYSICAL EXAM
[FreeTextEntry1] : \par Gen: A+O x 3 in NAD\par Psych: Normal mood and affect. Responds appropriately to commands\par Eyes: Anicteric. No discharge. EOMI.\par Resp: Breathing unlabored\par CV: DP pulses 2+ and equal. No varicosities noted\par Ext: No c/c/e\par Skin: No lesions noted\par \par Gait; +antalgic, listing to the right side, able to briefly stand on toes and heels with pain but able to perform calf raises consistently but <10 due to left leg spasm\par \par Inspection: Spine alignment is midline.\par Palpation: There is + tenderness over the midline spinous processes, paravertebral muscles lumbar region\par Lumbar ROM: Flexion, extension, side-bending, rotation, limited in all planes with worst pain during oblique extension and lateral flexion\par Hip ROM: Full and painless bilaterally.\par FAIR, FABERE negative bilaterally.\par \par 	Hip Flex Knee Ext Ankle Dorsi EHL Ankle Plantar\par Right	5-/5	 5/5	 5/5	 5/5	 5/5 \par Left	5/5	 5/5	 5/5	 5/5	 5/5 \par \par Hip abduction 4+/5\par Hip adduction 4+/5\par Knee Flexion 4+/5\par \par Tone: Normal. No clonus.\par Sensation: Grossly intact to light touch bilateral lower limbs.\par Proprioception: Intact at big toes bilaterally.\par Reflexes: 3+ symmetric knee jerk, 2+ ankle jerk, BUE 3+. Plantars downgoing bilaterally.\par SLR positive on the left\par Crossed SLR negative bilaterally. \par Slump test + right \par crossed slump + with left knee extension\par \par + trendelenburg during right stance leg\par + antalgic gait with ambulation ~75 feet

## 2022-04-12 NOTE — ASSESSMENT
[FreeTextEntry1] : Assessment/Plan:\par \par ADDIS BORJA is a 42 year female with acute onset of radicular low back pain presents for follow up visit . At the initial consultation, the acute onset pain was unlike any previous pain she had experienced in both distribution and intensity, had not ameliorated with her usual at-home regimen, and continued to cause her marked functional decline. At today's follow up, she has improved pain but functional remains limited due to her pain. Patient also identifies multiple psychosocial barriers to obtaining treatments for her condition including  for her young child in order to attend treatments as well as constant fear of permanent damage from her condition. \par \par 1. Radiculitis, Lumbosacral, L5, S1 - RIGHT\par 2. HNP, lumbosacral region\par 3. Lumbosacral spondylosis without myelopathy\par 4. Spasm of lumbar paraspinal muscle\par 5. Lumbar strain, subsequent encounter\par 6. Sacral pain\par \par - Tiers of treatment and management of above diagnosis(es) were discussed with patient\par - Optimal diet, weight, sleep, and lifestyle management to minimize stress and maximize well being counseling provided\par - Imaging reviewed with patient including MRI lumbar spine\par - Patient was advised to start a structured, targeted therapy program for 6-8 weeks\par - Patient was educated on an appropriate home exercise program, provided with exercise recommendations, all questions answered\par - [facility]\par - Patient will commence gabapentin 100mg qHS x 1 week, will uptitrate weekly until 300mg qHS\par - Patient was advised to continue methocarbamol 750mg 1-2 tabs up to TID, reviewed sedation and need to avoid driving or operating heavy machinery, also reviewed timing with her additional pharmacologic treatments, patient displayed a clear understanding of plan of care, all questions answered\par - Patient was advised to apply cool compresses or warm heat to affected regions PRN\par - Patient completed medrol dose pack after last visit 3/2022\par \par - Educated about red flag symptoms including (but not limited to) new, worsened, or persistent: fever greater than 100F, bowel or bladder incontinence, bowel obstipation, inability to void urine, urinary leakage, Severe nausea or vomiting, Worsening numbness, worsening tingling/paresthesias, and/or new or progressive motor weakness; advised to seek immediate medical attention at his nearest Emergency department should they experience any of the above\par \par - Follow up in 4-6 weeks, consider local treatment at that time\par \par I have personally spent a total of at least 45 minutes preparing, reviewing internal and external records, explaining, counseling, and coordinating care for this patient encounter.\par \par Thank you, Dr Dee Hall, for allowing me to participate in the care of your patient. Please do not hesitate to contact me with questions/concerns.\par \par Jaden Navarrete M.D.\par Sports and Interventional Spine\par Department of Physical Medicine and Rehabilitation \par [facility] \par Email: [email or web address]\par \par [facility] [place] \par [facility]\par [address], [address]\[place] [address]\par \par Appointments: [phone]\par Fax: [phone].

## 2022-04-12 NOTE — HISTORY OF PRESENT ILLNESS
[FreeTextEntry1] : Jaden Navarrete M.D.\par Sports Medicine and Interventional Spine\par Department of Physical Medicine and Rehabilitation \par [facility] \par Email: [email or web address] <[email or web address]>\par \par [facility]\par [facility] [place] [facility]\par [address] [place], [address]th Floor\[place] [address]\par \par [facility]\par [facility] [place] [facility]\par [address], 4th Floor\[place] [address]\par \par [facility] at \par Critical access hospital\par [address], 6th Floor\[place] [address]\par \par [facility] at [facility]\par [address] [facility]\[place] [address]\par \par [facility] [place] \par [facility]\par [address] [facility] [address], [address]\[place] [address]\par \par For [place] Appointments\par Phone: [phone]\par Fax: [phone]\par \par For [place] Appointments\par Phone: [phone]\par Fax: [phone]\par \par \par ----------------------------------------------------------------------------------------------------------------------------------------\par \par PATIENT: ADDIS BORJA \par MRN: [id number] \par YOB: 1980 \par DATE OF VISIT: 04/12/2022\par Referred by Doesnt have PCP\par PCP ADDRESS:\par \par Apr 12, 2022 \par \par \par Dear Dr. RITTER,REFERRED \par \par Thank you for referring ADDIS BORJA to my Sports and Interventional Spine practice and office. Enclosed is a copy of the patient's consultation/progress note, which includes my complete assessment and recent studies completed during the patient's evaluation.\par \par If you have questions or have any patients who require nonsurgical, non-opiate management of any sports, spine, or musculoskeletal conditions, please do not hesitate to contact my , Celina Aiken at [phone].\par \par I look forward to taking care of your patients along with you.\par \par Sincerely,\par \par Jaden\par \par Jaden Navarrete MD\par Sports, Interventional Spine, & Regenerative Musculoskeletal Medicine\par [facility] [place] [facility]\par Email: [email or web address]\par \par \par                                                   Follow Up Visit\par CC: lumbar pain\par \par HPI:  This is a follow up visit to [facility] [place] [facility] Medicine and Interventional Spine Practice.  \par \par ADDIS BORJA presents with the chief complaint as above.  \par \par Interval Hx on 4/12/22: pain is overall improved, but continues to have intermittently moderate pain while doing activity, pain at its worst 6-7/10; patient reports having minimal time for therapy treatments or home exercise program due to caring for her child\par Aggravating: prolonged ambulation, prolonged standing, extending her legs, sit to stand transitional movements, turning in bed, getting out of bed\par Alleviating: rest, avoiding prolonged standing, pharmacologic treatments (baclofen, naproxen)\par Meds: denies regular PO pain medications\par Therapy Program: no recent structured targeted therapy program\par HEP: not doing HEP regularly\par \par Assoc Sx:\par positional tingling of the left foot overnight, resolves with change in position +/- getting out of bed\par Denies Numbness\par Denies Tingling\par Denies Focal motor weakness in the upper or lower limbs\par Denies New or worsened bowel or bladder incontinence\par Denies Saddle anesthesia\par Denies Buckling\par Denies Using Orthotic(s)\par Denies Swelling in the upper/lower extremities\par They also deny frequent tripping, falling\par \par ROS: A 14 point review of systems was completed. Positive findings are pain as described above. The remaining systems negative.\par \par Initial Hx on 3/28/22: reports two weeks of intermittently severe left lower limb radicular pain and left lumbar pain. cannot cite inciting event, carrying groceries and caring for her child. she has had previous flare ups that resolved with conservative treatment. previous flare ups included pain radiating down the left limb. The patient’s difficulties began two weeks ago, on and off for several years. The pain is graded as 8-9/10 particularly while standing\par The pain is described as sharp, lancinating pain in the left lower limb. The pain is intermittent. The pain radiates in the left LOWER limbs in a L5, S1 distribution. The patient feels that the pain is overall persistent. Patient denies other recent fall, MVA, injury, trauma, or accident besides presenting history above. Patient reports increasing frequency of severe pain as well as increasingly severe intensity of pain. Patient reports having difficulty with ambulation indoors due to pain and stiffness from the left lumbar region. Patient reports difficulty with everyday activities including reaching down/bending forward for lower body dressing, toileting, care for her child\par \par \par Breast Cancer Surveillance: up to date\par COVID HX: reviewed\par \par Assoc Hx:\par Ambulates without assistive device\par Injection Hx: denies locally directed treatment to the area in question\par Imaging Hx: reviewed\par \par Level of functioning: indep with ambulation, indep with ADLs\par Living Situation: dwelling with steps to enter\par \par

## 2022-05-10 ENCOUNTER — APPOINTMENT (OUTPATIENT)
Dept: PHYSICAL MEDICINE AND REHAB | Facility: CLINIC | Age: 42
End: 2022-05-10
Payer: MEDICAID

## 2022-05-10 VITALS — HEART RATE: 81 BPM | SYSTOLIC BLOOD PRESSURE: 117 MMHG | OXYGEN SATURATION: 100 % | DIASTOLIC BLOOD PRESSURE: 80 MMHG

## 2022-05-10 DIAGNOSIS — M62.89 OTHER SPECIFIED DISORDERS OF MUSCLE: ICD-10-CM

## 2022-05-10 DIAGNOSIS — M62.830 MUSCLE SPASM OF BACK: ICD-10-CM

## 2022-05-10 DIAGNOSIS — M53.3 SACROCOCCYGEAL DISORDERS, NOT ELSEWHERE CLASSIFIED: ICD-10-CM

## 2022-05-10 DIAGNOSIS — M67.959 UNSPECIFIED DISORDER OF SYNOVIUM AND TENDON, UNSPECIFIED THIGH: ICD-10-CM

## 2022-05-10 DIAGNOSIS — M54.17 RADICULOPATHY, LUMBOSACRAL REGION: ICD-10-CM

## 2022-05-10 PROCEDURE — 99215 OFFICE O/P EST HI 40 MIN: CPT

## 2022-05-10 NOTE — PHYSICAL EXAM
[FreeTextEntry1] : Gen: A+O x 3 in NAD\par Psych: Normal mood and affect. Responds appropriately to commands\par Eyes: Anicteric. No discharge. EOMI.\par Resp: Breathing unlabored\par CV: DP pulses 2+ and equal. No varicosities noted\par Ext: No c/c/e\par Skin: No lesions noted\par \par Gait; +antalgic, listing to the right side, able to stand on toes and heels \par tandem gait intact\par romberg negative\par prontator drift neg\par \par calf raises able to perfom on the right, more difficulty on the left \par \par Inspection: Spine alignment is midline.\par Palpation: There is + tenderness over the midline spinous processes, paravertebral muscles lumbar region\par Lumbar ROM: Flexion, extension, side-bending, rotation, limited in all planes with worst pain during oblique extension and lateral flexion\par Hip ROM: Full and painless bilaterally.\par FAIR, FABERE negative bilaterally.\par \par 	Hip Flex Knee Ext Ankle Dorsi EHL Ankle Plantar\par Right	5-/5	 5/5	 5/5	 5/5	 5/5 \par Left	5/5	 5/5	 5/5	 5/5	 5/5 \par \par Hip abduction 4+/5\par Hip adduction 4+/5\par Knee Flexion 4+/5\par \par Tone: Normal. No clonus.\par Sensation: Grossly intact to light touch bilateral lower limbs.\par Proprioception: Intact at big toes bilaterally.\par Reflexes: 3+ symmetric knee jerk, 2+ ankle jerk, BUE 3+.\par hard to assess reflexes the patient was asked to relax the lower limbs but took several minutes to elicit her relfexes, also at times appears to have velocity dependent and non-velocity dependent stiffness of the ankles, depsite multiple verbal cues\par  Plantars downgoing bilaterally.\par SLR positive on the left\par Crossed SLR negative bilaterally. \par Slump test + left \par crossed slump neg today 5/10/22 with right knee extension\par \par neg trendelenburg during right stance leg\par + antalgic gait with ambulation ~75 feet.

## 2022-05-10 NOTE — ASSESSMENT
[FreeTextEntry1] : Assessment/Plan:\par \par ADDIS BORJA is a 42 year female with acute onset of radicular low back pain presents for follow up visit. \par \par At the initial consultation, the acute onset pain was unlike any previous pain she had experienced in both distribution and intensity, had not ameliorated with her usual at-home regimen, and continued to cause her marked functional decline.\par \par  At 4/12/22 follow up, she has improved pain but functional remains limited due to her pain. Patient also identifies multiple psychosocial barriers to obtaining treatments for her condition including  for her young child in order to attend treatments as well as constant fear of permanent damage from her condition. \par \par At 5/10/22 follow up, the patient has had interval amelioration of her pain symptoms, suffered a set back from exertion (went for a long walk), and has been trialing multiple over the counter and prescribed medication. Patient's pain is now mostly low back stiffness with persistent left lower limb sensory disturbance. Patient would benefit from consultation with specialists for the lumbar spine condition and for her documented neurologic examination findings. Due to a confluence of factors, patient has yet to start a PT program, during our encounter today, writer called with patient present a location near the patient's home and was assured of a PT evaluation before June 2022; patient was provided with contact information for the PT location.\par \par 1. Lumbar strain, subsequent encounter\par 2. Radiculitis, Lumbosacral, L5, S1 - RIGHT\par 3. HNP, lumbosacral region\par 4. Sensory disturbance of the left leg\par 5. Lumbosacral spondylosis without myelopathy\par 6. Spasm of lumbar paraspinal muscle\par 7. Sacral pain\par \par - Tiers of treatment and management of above diagnosis(es) were discussed with patient\par - Optimal diet, weight, sleep, and lifestyle management to minimize stress and maximize well being counseling provided\par - Imaging reviewed with patient including MRI lumbar spine\par - Patient was advised to start a structured, targeted therapy program for 6-8 weeks\par - Patient was educated on an appropriate home exercise program, provided with exercise recommendations, all questions answered\par - [facility]\par - Patient advised to continue gabapentin 300mg qHS\par - Patient may continue to trial methocarbamol 750mg 1-2 tabs up to TID, reviewed sedation and need to avoid driving or operating heavy machinery, also reviewed timing with her additional pharmacologic treatments, patient displayed a clear understanding of plan of care, all questions answered\par - Patient referred to neurology to seek their evaluation of the patient's apparent increased lower limb muscle tone, sensory disturbance, and consider their previous treatment with baclofen which she was taking upon presentation to my practice months ago\par - Patient advised to obtain nerve conduction testing, EMG to assess the lower limb sensory disturbance and identify a possible peripheral (neuropathic) disorder contributing to her symptomatology\par - Patient advised to seek consultation with Orthopedic Spine Surgery for her lumbar DDD that is not wholly resolved with conservative management, patient at times frustrated with the chronicity of her symptoms and interested in exploring all possible treatment options to get back to her baseline\par - Patient was advised to apply cool compresses or warm heat to affected regions PRN\par - Patient completed medrol dose pack after last visit 3/2022\par \par - Educated about red flag symptoms including (but not limited to) new, worsened, or persistent: fever greater than 100F, bowel or bladder incontinence, bowel obstipation, inability to void urine, urinary leakage, Severe nausea or vomiting, Worsening numbness, worsening tingling/paresthesias, and/or new or progressive motor weakness; advised to seek immediate medical attention at his nearest Emergency department should they experience any of the above\par \par - Follow up in 2 months\par \par I have personally spent a total of at least 45 minutes preparing, reviewing internal and external records, explaining, counseling, and coordinating care for this patient encounter.\par \par Thank you, Dr Dee Hall, for allowing me to participate in the care of your patient. Please do not hesitate to contact me with questions/concerns.\par \par Jaden Navarrete M.D.\par Sports and Interventional Spine\par Department of Physical Medicine and Rehabilitation \par [facility] \par Email: [email or web address]\par \par [facility] [place] \par [place] [facility]\par [address] [facility] [address] [facility] [address], [address]\[place] [address]\par \par Appointments: [phone]\par Fax: [phone]. \par \par

## 2022-05-10 NOTE — HISTORY OF PRESENT ILLNESS
[FreeTextEntry1] : Jaden Navarrete M.D.\par Sports Medicine and Interventional Spine\par Department of Physical Medicine and Rehabilitation \par [facility] \par Email: [email or web address] <[email or web address]>\par \par [facility]\par [facility] [place] [facility]\par [address] [place], [address]\[place] [address]\par \par [facility]\par [facility] [place] at [facility]\par [address], 4[address]\[place] [address]\par \par [facility] \par [facility]\par [address], [address]\[place] [address]\par \par [facility] at [facility]\par [address]\[place] [address]\par \par [facility] [place] \par [place] [facility]\par [address] [facility] [address]\[place] [address]\par \par For [place] Appointments\par Phone: [phone]\par Fax: [phone]\par \par For [place] Appointments\par Phone: [phone]\par Fax: [phone]\par \par \par ----------------------------------------------------------------------------------------------------------------------------------------\par \par PATIENT: ADDIS BORJA \par MRN: [id number] \par YOB: 1980 \par DATE OF VISIT: 05/09/2022\par Referred by Doesnt have PCP\par PCP ADDRESS:\par \par May 09, 2022 \par \par \par Dear \par \par Thank you for referring ADDIS BORJA to my Sports and Interventional Spine practice and office. Enclosed is a copy of the patient's consultation/progress note, which includes my complete assessment and recent studies completed during the patient's evaluation.\par \par If you have questions or have any patients who require nonsurgical, non-opiate management of any sports, spine, or musculoskeletal conditions, please do not hesitate to contact my , Celina Aiken at [phone].\par \par I look forward to taking care of your patients along with you.\par \par Sincerely,\par \par Jaden\par \par Jaden Navarrete MD\par Sports, Interventional Spine, & Regenerative Musculoskeletal Medicine\par [facility] [place] [facility] Email: [email or web address] \par \par     Follow Up Visit\par CC: lumbar pain\par \par HPI: This is a follow up visit to [facility] [place] [facility] Sports Medicine and Interventional Spine Practice. \par \par ADDIS BORJA presents with the chief complaint as above. \par \par Interval Hx on May 10, 2022: presents in person for follow up. Since last visit, patient has had intermittent neck and low back pain related to increased exertion last Thursday. Patient states that up to last week patient had made significant progress and was able to go for walks with her family. Patient reports having a return of the lumbar spine stiffness and spasm. She has continued to participate in home exercise program. Pain is not present at this time, patient reports 'tension' throughout the lower limbs diffusely, sacro-coccygeal region. [facility] but has decided that their treatments were not in budget at this time. Last week patient found a location for their PT, scheduled for evaluation in June 2022.\par \par Aggravating: prolonged ambulation, prolonged standing, extending her legs, sit to stand transitional movements, turning in bed, getting out of bed. \par Alleviating: rest, avoiding prolonged standing, pharmacologic treatments (baclofen, naproxen). \par Meds: denies regular PO pain medications. \par Therapy Program: no recent structured targeted therapy program. \par HEP: not doing HEP regularly\par \par Assoc Sx:\par slightly improved as of 5/10/22: positional tingling of the left foot overnight, resolves with change in position +/- getting out of bed\par Denies Numbness\par Denies Tingling\par Denies Focal motor weakness in the upper or lower limbs\par Denies New or worsened bowel or bladder incontinence\par Denies Saddle anesthesia\par Denies Buckling\par Denies Using Orthotic(s)\par Denies Swelling in the upper/lower extremities\par They also deny frequent tripping, falling\par \par ROS: A 14 point review of systems was completed. Positive findings are pain as described above. The remaining systems negative.\par \par Interval Hx on 4/12/22: pain is overall improved, but continues to have intermittently moderate pain while doing activity, pain at its worst 6-7/10; patient reports having minimal time for therapy treatments or home exercise program due to caring for her child. \par \par Initial Hx on 3/28/22: reports two weeks of intermittently severe left lower limb radicular pain and left lumbar pain. cannot cite inciting event, carrying groceries and caring for her child. she has had previous flare ups that resolved with conservative treatment. previous flare ups included pain radiating down the left limb. The patient’s difficulties began two weeks ago, on and off for several years. The pain is graded as 8-9/10 particularly while standing on certain occasions, The pain is described as sharp, lancinating pain in the left lower limb. The pain is intermittent. The pain radiates in the left LOWER limbs in a L5, S1 distribution. The patient feels that the pain is overall persistent. Patient denies other recent fall, MVA, injury, trauma, or accident besides presenting history above. Patient reports increasing frequency of severe pain as well as increasingly severe intensity of pain. Patient reports having difficulty with ambulation indoors due to pain and stiffness from the left lumbar region. Patient reports difficulty with everyday activities including reaching down/bending forward for lower body dressing, toileting, care for her child\par \par Breast Cancer Surveillance: up to date\par COVID HX: reviewed\par \par Assoc Hx:\par Ambulates without assistive device\par Injection Hx: denies locally directed treatment to the area in question\par Imaging Hx: reviewed\par \par Level of functioning: indep with ambulation, indep with ADLs\par Living Situation: dwelling with steps to enter\par \par

## 2022-05-11 ENCOUNTER — TRANSCRIPTION ENCOUNTER (OUTPATIENT)
Age: 42
End: 2022-05-11

## 2022-05-13 ENCOUNTER — APPOINTMENT (OUTPATIENT)
Dept: ORTHOPEDIC SURGERY | Facility: CLINIC | Age: 42
End: 2022-05-13
Payer: MEDICAID

## 2022-05-13 VITALS
WEIGHT: 180 LBS | SYSTOLIC BLOOD PRESSURE: 127 MMHG | DIASTOLIC BLOOD PRESSURE: 74 MMHG | BODY MASS INDEX: 28.25 KG/M2 | HEART RATE: 76 BPM | HEIGHT: 67 IN

## 2022-05-13 PROCEDURE — 99203 OFFICE O/P NEW LOW 30 MIN: CPT

## 2022-05-13 NOTE — DISCUSSION/SUMMARY
[de-identified] : We discussed further treatment options.  I recommend she engage in a course of physical therapy.  Should this fail to alleviate her symptoms she may be a candidate for injection based therapies.  From a surgical perspective she would likely require lumbar fusion.  I have recommended she exhaust all nonsurgical treatments before considering such a procedure.  She will let me know of any changes or worsening of her symptoms.

## 2022-05-13 NOTE — PHYSICAL EXAM
[Antalgic] : not antalgic [de-identified] : Examination of the lumbar spine reveals no midline tenderness palpation, step-offs, or skin lesions. Decreased range of motion with respect to flexion, extension, lateral bending, and rotation. No tenderness to palpation of the sciatic notch. No tenderness palpation of the bilateral greater trochanters. No pain with passive internal/external rotation of the hips. No instability of bilateral lower extremities.  Negative NOEMI. Negative straight leg raise bilaterally. No bowstring. Negative femoral stretch. 5 out of 5 iliopsoas, hip abductors, hips adductors, quadriceps, hamstrings, gastrocsoleus, tibialis anterior, extensor hallucis longus, peroneals. Grossly intact sensation to light touch bilateral lower extremities. 1+ patellar and Achilles reflexes. Downgoing Babinski. No clonus. Intact proprioception. Palpable pulses. No skin lesion and no edema on the right and left lower extremities. [de-identified] : Review of her lumbar spine MRI does reveal degenerative disc disease L5-S1.  No high-grade neurocompression.

## 2022-05-25 ENCOUNTER — APPOINTMENT (OUTPATIENT)
Dept: PHYSICAL MEDICINE AND REHAB | Facility: CLINIC | Age: 42
End: 2022-05-25
Payer: MEDICAID

## 2022-05-25 VITALS
HEART RATE: 77 BPM | SYSTOLIC BLOOD PRESSURE: 109 MMHG | OXYGEN SATURATION: 100 % | TEMPERATURE: 97.2 F | DIASTOLIC BLOOD PRESSURE: 72 MMHG

## 2022-05-25 PROCEDURE — 95885 MUSC TST DONE W/NERV TST LIM: CPT

## 2022-05-25 PROCEDURE — 99212 OFFICE O/P EST SF 10 MIN: CPT | Mod: 25

## 2022-05-25 PROCEDURE — 95910 NRV CNDJ TEST 7-8 STUDIES: CPT

## 2022-05-27 ENCOUNTER — APPOINTMENT (OUTPATIENT)
Dept: PHYSICAL MEDICINE AND REHAB | Facility: CLINIC | Age: 42
End: 2022-05-27
Payer: MEDICAID

## 2022-05-27 DIAGNOSIS — R20.9 UNSPECIFIED DISTURBANCES OF SKIN SENSATION: ICD-10-CM

## 2022-05-27 PROCEDURE — 99443: CPT

## 2022-05-27 RX ORDER — ACETAMINOPHEN 500 MG/1
500 TABLET ORAL 3 TIMES DAILY
Qty: 84 | Refills: 1 | Status: ACTIVE | COMMUNITY
Start: 2022-05-27 | End: 1900-01-01

## 2022-05-27 RX ORDER — MELOXICAM 15 MG/1
15 TABLET ORAL
Qty: 30 | Refills: 1 | Status: ACTIVE | COMMUNITY
Start: 2022-04-07 | End: 1900-01-01

## 2022-07-13 ENCOUNTER — RX RENEWAL (OUTPATIENT)
Age: 42
End: 2022-07-13

## 2022-07-13 DIAGNOSIS — S39.012A STRAIN OF MUSCLE, FASCIA AND TENDON OF LOWER BACK, INITIAL ENCOUNTER: ICD-10-CM

## 2022-07-13 DIAGNOSIS — M51.27 OTHER INTERVERTEBRAL DISC DISPLACEMENT, LUMBOSACRAL REGION: ICD-10-CM

## 2022-07-13 DIAGNOSIS — R29.898 OTHER SYMPTOMS AND SIGNS INVOLVING THE MUSCULOSKELETAL SYSTEM: ICD-10-CM

## 2022-07-13 DIAGNOSIS — M51.36 OTHER INTERVERTEBRAL DISC DEGENERATION, LUMBAR REGION: ICD-10-CM

## 2022-07-13 RX ORDER — GABAPENTIN 100 MG/1
100 CAPSULE ORAL
Qty: 90 | Refills: 0 | Status: ACTIVE | COMMUNITY
Start: 2022-04-12 | End: 1900-01-01

## 2022-07-14 ENCOUNTER — RX RENEWAL (OUTPATIENT)
Age: 42
End: 2022-07-14

## 2022-08-25 ENCOUNTER — APPOINTMENT (OUTPATIENT)
Dept: PHYSICAL MEDICINE AND REHAB | Facility: CLINIC | Age: 42
End: 2022-08-25

## 2022-08-25 DIAGNOSIS — M54.16 RADICULOPATHY, LUMBAR REGION: ICD-10-CM

## 2022-08-25 DIAGNOSIS — M79.671 PAIN IN RIGHT FOOT: ICD-10-CM

## 2022-08-25 DIAGNOSIS — M79.605 PAIN IN LEFT LEG: ICD-10-CM

## 2022-08-25 DIAGNOSIS — M47.817 SPONDYLOSIS W/OUT MYELOPATHY OR RADICULOPATHY, LUMBOSACRAL REGION: ICD-10-CM

## 2022-08-25 DIAGNOSIS — M79.675 PAIN IN LEFT LEG: ICD-10-CM

## 2022-08-25 PROCEDURE — 99442: CPT

## 2022-08-26 ENCOUNTER — TRANSCRIPTION ENCOUNTER (OUTPATIENT)
Age: 42
End: 2022-08-26

## 2022-08-29 PROBLEM — M47.817 LUMBOSACRAL SPONDYLOSIS WITHOUT MYELOPATHY: Status: ACTIVE | Noted: 2022-03-28

## 2022-08-29 PROBLEM — M79.671 RIGHT FOOT PAIN: Status: ACTIVE | Noted: 2022-08-25

## 2022-08-29 PROBLEM — M54.16 LUMBAR RADICULOPATHY: Status: ACTIVE | Noted: 2022-05-13

## 2022-08-29 PROBLEM — M79.605 PAINFUL LEGS AND MOVING TOES OF LEFT FOOT: Status: ACTIVE | Noted: 2022-08-25

## 2022-12-20 ENCOUNTER — TRANSCRIPTION ENCOUNTER (OUTPATIENT)
Age: 42
End: 2022-12-20